# Patient Record
Sex: MALE | Race: WHITE | NOT HISPANIC OR LATINO | Employment: FULL TIME | URBAN - METROPOLITAN AREA
[De-identification: names, ages, dates, MRNs, and addresses within clinical notes are randomized per-mention and may not be internally consistent; named-entity substitution may affect disease eponyms.]

---

## 2022-07-02 ENCOUNTER — HOSPITAL ENCOUNTER (EMERGENCY)
Facility: CLINIC | Age: 37
Discharge: HOME | End: 2022-07-02
Attending: EMERGENCY MEDICINE | Admitting: EMERGENCY MEDICINE
Payer: COMMERCIAL

## 2022-07-02 ENCOUNTER — APPOINTMENT (OUTPATIENT)
Dept: GENERAL RADIOLOGY | Facility: CLINIC | Age: 37
End: 2022-07-02
Attending: EMERGENCY MEDICINE
Payer: COMMERCIAL

## 2022-07-02 VITALS
SYSTOLIC BLOOD PRESSURE: 138 MMHG | DIASTOLIC BLOOD PRESSURE: 73 MMHG | OXYGEN SATURATION: 99 % | HEART RATE: 97 BPM | RESPIRATION RATE: 20 BRPM | TEMPERATURE: 98.5 F

## 2022-07-02 DIAGNOSIS — S00.83XA CONTUSION OF JAW, INITIAL ENCOUNTER: ICD-10-CM

## 2022-07-02 DIAGNOSIS — S40.011A CONTUSION OF RIGHT SHOULDER, INITIAL ENCOUNTER: ICD-10-CM

## 2022-07-02 DIAGNOSIS — S00.531A CONTUSION OF LIP, INITIAL ENCOUNTER: ICD-10-CM

## 2022-07-02 PROCEDURE — 99283 EMERGENCY DEPT VISIT LOW MDM: CPT

## 2022-07-02 PROCEDURE — 250N000013 HC RX MED GY IP 250 OP 250 PS 637: Performed by: EMERGENCY MEDICINE

## 2022-07-02 PROCEDURE — 70100 X-RAY EXAM OF JAW <4VIEWS: CPT

## 2022-07-02 RX ORDER — IBUPROFEN 600 MG/1
600 TABLET, FILM COATED ORAL ONCE
Status: COMPLETED | OUTPATIENT
Start: 2022-07-02 | End: 2022-07-02

## 2022-07-02 RX ADMIN — IBUPROFEN 600 MG: 600 TABLET ORAL at 03:44
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
## 2022-09-16 ENCOUNTER — HOSPITAL ENCOUNTER (EMERGENCY)
Facility: CLINIC | Age: 37
Discharge: HOME | End: 2022-09-16
Attending: EMERGENCY MEDICINE | Admitting: EMERGENCY MEDICINE
Payer: OTHER MISCELLANEOUS

## 2022-09-16 VITALS
RESPIRATION RATE: 16 BRPM | TEMPERATURE: 98.1 F | HEART RATE: 74 BPM | OXYGEN SATURATION: 100 % | DIASTOLIC BLOOD PRESSURE: 93 MMHG | SYSTOLIC BLOOD PRESSURE: 148 MMHG

## 2022-09-16 DIAGNOSIS — M54.50 LOW BACK PAIN RADIATING TO RIGHT LOWER EXTREMITY: Primary | ICD-10-CM

## 2022-09-16 DIAGNOSIS — M79.604 LOW BACK PAIN RADIATING TO RIGHT LOWER EXTREMITY: Primary | ICD-10-CM

## 2022-09-16 PROCEDURE — 99284 EMERGENCY DEPT VISIT MOD MDM: CPT

## 2022-09-16 PROCEDURE — 250N000013 HC RX MED GY IP 250 OP 250 PS 637: Performed by: EMERGENCY MEDICINE

## 2022-09-16 RX ORDER — CYCLOBENZAPRINE HCL 10 MG
10 TABLET ORAL ONCE
Status: COMPLETED | OUTPATIENT
Start: 2022-09-16 | End: 2022-09-16

## 2022-09-16 RX ORDER — IBUPROFEN 600 MG/1
600 TABLET, FILM COATED ORAL EVERY 6 HOURS PRN
Qty: 60 TABLET | Refills: 0 | Status: SHIPPED | OUTPATIENT
Start: 2022-09-16

## 2022-09-16 RX ORDER — ACETAMINOPHEN 500 MG
1000 TABLET ORAL ONCE
Status: DISCONTINUED | OUTPATIENT
Start: 2022-09-16 | End: 2022-09-17 | Stop reason: HOSPADM

## 2022-09-16 RX ORDER — CYCLOBENZAPRINE HCL 10 MG
10 TABLET ORAL 3 TIMES DAILY PRN
Qty: 21 TABLET | Refills: 0 | Status: SHIPPED | OUTPATIENT
Start: 2022-09-16 | End: 2022-09-23

## 2022-09-16 RX ORDER — LIDOCAINE 4 G/G
1 PATCH TOPICAL ONCE
Status: DISCONTINUED | OUTPATIENT
Start: 2022-09-16 | End: 2022-09-17 | Stop reason: HOSPADM

## 2022-09-16 RX ORDER — ACETAMINOPHEN 500 MG
1000 TABLET ORAL EVERY 6 HOURS PRN
Qty: 60 TABLET | Refills: 0 | Status: SHIPPED | OUTPATIENT
Start: 2022-09-16 | End: 2022-09-23

## 2022-09-16 RX ORDER — LIDOCAINE 4 G/G
1 PATCH TOPICAL EVERY 24 HOURS
Qty: 10 PATCH | Refills: 0 | Status: SHIPPED | OUTPATIENT
Start: 2022-09-16

## 2022-09-16 RX ORDER — METHYLPREDNISOLONE 4 MG
TABLET, DOSE PACK ORAL
Qty: 21 TABLET | Refills: 0 | Status: SHIPPED | OUTPATIENT
Start: 2022-09-16

## 2022-09-16 RX ADMIN — CYCLOBENZAPRINE 10 MG: 10 TABLET, FILM COATED ORAL at 23:15

## 2022-09-16 ASSESSMENT — ENCOUNTER SYMPTOMS
JOINT SWELLING: 0
DIARRHEA: 0
NAUSEA: 0
NUMBNESS: 0
NECK PAIN: 0
FLANK PAIN: 0
DYSURIA: 0
WEAKNESS: 0
VOMITING: 0
BACK PAIN: 1
ABDOMINAL PAIN: 0
SHORTNESS OF BREATH: 0
MYALGIAS: 0
FEVER: 0

## 2022-09-16 ASSESSMENT — ACTIVITIES OF DAILY LIVING (ADL): ADLS_ACUITY_SCORE: 33

## 2022-09-17 NOTE — ED PROVIDER NOTES
History   Chief Complaint:  Back Pain    HPI   Eleuterio Gallo is a 36 year old male who presents with low back pain.  Patient notes that he developed bilateral low back pain worse on the right side while at work.  He denies any direct trauma to his back but was lifting things and it started.  He is worried he may have pulled a muscle.  He has no history of surgery to the back.  No history of IV drug use.  Denies fever.  Denies dysuria and urinary incontinence.  Denies any stool incontinence.  Denies numbness or tingling to the lower extremities.  He does note that when he tries to lift his leg when he is standing up or sitting down he has increasing pain in the lower back.  He has tried ibuprofen with minimal improvement in his symptoms.  He is worried about going to work tomorrow with the ongoing back pain.    ROS:  Review of Systems   Constitutional: Negative for fever.   Respiratory: Negative for shortness of breath.    Cardiovascular: Negative for chest pain.   Gastrointestinal: Negative for abdominal pain, diarrhea, nausea and vomiting.   Genitourinary: Negative for dysuria and flank pain.   Musculoskeletal: Positive for back pain. Negative for joint swelling, myalgias and neck pain.   Skin: Negative for rash.   Neurological: Negative for weakness and numbness.   All other systems reviewed and are negative.    Allergies:  No Known Allergies     Medications:    acetaminophen (TYLENOL) 500 MG tablet  cyclobenzaprine (FLEXERIL) 10 MG tablet  ibuprofen (ADVIL/MOTRIN) 600 MG tablet  Lidocaine (LIDOCARE) 4 % Patch  methylPREDNISolone (MEDROL DOSEPAK) 4 MG tablet therapy pack  benzoyl peroxide (BENZOYL PEROXIDE WASH) 5 % LIQD  doxycycline (VIBRAMYCIN) 100 MG capsule  KEFLEX 500 MG OR CAPS  tretinoin (RETIN-A) 0.05 % cream      Past Medical History:    No past medical history on file.    Past Surgical History:    No past surgical history on file.     Family History:    family history is not on file.    Social  History:   reports that he has never smoked. He does not have any smokeless tobacco history on file.  PCP: [facility], [place]     Physical Exam     Patient Vitals for the past 24 hrs:   BP Temp Temp src Pulse Resp SpO2   09/16/22 2131 (!) 148/93 98.1  F (36.7  C) Temporal 74 16 100 %      Physical Exam  General: The patient appears uncomfortable  Head:  The scalp, face, and head appear normal  Eyes:  The pupils are equal, round, and reactive to light    There is no nystagmus    Extraocular muscles are intact    Conjunctivae and sclerae are normal  ENT:    The nose is normal    Pinnae are normal    The oropharynx is normal    Uvula is in the midline  Neck:  Normal range of motion    There is no midline cervical spine pain/tenderness  CV:  Regular rate and underlying rhythm     Normal S1 and S2    No S3 or S4    No pathological murmur detected  Resp:  Lungs are clear    There is no tachypnea    Non-labored breathing    No rales    No wheezing   GI:  Abdomen is soft, there is no rigidity    No distension    No tympani    No rebound tenderness     Non-surgical without peritoneal features  MS:  Back:    The cervical and thoracic spine is non-tender in the midline    The lumbar spine is non-tender in the midline    There is right lumbar paraspinous muscular pain to palpation    Legs:    There is normal motor strength of bilateral lower extremities    There is no sensory abnormality/paresthesia    There is no numbness    There is no radiculopathy    There is normal capillary refill to the toes  Skin:  No rash or acute skin lesions noted.  No shingles noted.  Neuro: Speech is normal and fluent.  Antalgic gait.  Psych:  Awake. Alert.  Normal affect.  Appropriate interactions.    Emergency Department Course     Laboratory:  Labs Ordered and Resulted from Time of ED Arrival to Time of ED Departure - No data to display     Procedures     Emergency Department Course:     Reviewed:  I reviewed nursing notes, vitals and  past medical history    Assessments:  2250 I obtained history and examined the patient as noted above.   2300 I rechecked the patient and explained findings.     Consults:   None    Interventions:  Medications   acetaminophen (TYLENOL) tablet 1,000 mg (1,000 mg Oral Not Given 9/16/22 2316)   Lidocaine (LIDOCARE) 4 % Patch 1 patch (1 patch Transdermal Not Given 9/16/22 2316)   lidocaine patch in PLACE (has no administration in time range)   cyclobenzaprine (FLEXERIL) tablet 10 mg (10 mg Oral Given 9/16/22 2315)      Disposition:  The patient was discharged to home.     Impression & Plan    CMS Diagnoses: None    Medical Decision Making:  Eleuterio Gallo is a 36 year old male who presents for evaluation of back pain that started after bending/lifting maneuvers at work.  He does not have a history of back pain in the past.  Pain has improved with interventions in the emergency department. The patient did not sustain any trauma, therefore x-rays are not necessary due to the low likelihood of fracture or subluxation.  No red flag symptoms to suggest CT and/or MRI is indicated at this point.  There is no clinical evidence of cauda equina syndrome, discitis, spinal/epidural space hematoma or epidural abscess or other worrisome etiology. The neurological exam is normal and the patient's symptoms seem consistent with musculoskeletal issues and significant muscle spasm.     The patient will be discharged with pain medications & a steroid dose danny to use as directed. Ice or heat to the back and stretching exercises. No heavy lifting, bending or twisting. Return if increasing pain, numbness, weakness, or bowel or bladder dysfunction. He was advised to schedule follow-up with his primary doctor within 3-7 days to re-assess symptoms.    Diagnosis:    ICD-10-CM    1. Low back pain radiating to right lower extremity  M54.50     M79.604       Discharge Medications:  Discharge Medication List as of 9/16/2022 11:06 PM      START  taking these medications    Details   acetaminophen (TYLENOL) 500 MG tablet Take 2 tablets (1,000 mg) by mouth every 6 hours as needed for pain or fever, Disp-60 tablet, R-0, Local Print      cyclobenzaprine (FLEXERIL) 10 MG tablet Take 1 tablet (10 mg) by mouth 3 times daily as needed for muscle spasms, Disp-21 tablet, R-0, Local Print      ibuprofen (ADVIL/MOTRIN) 600 MG tablet Take 1 tablet (600 mg) by mouth every 6 hours as needed for moderate pain, Disp-60 tablet, R-0, Local Print      Lidocaine (LIDOCARE) 4 % Patch Place 1 patch onto the skin every 24 hours To prevent lidocaine toxicity, patient should be patch free for 12 hrs daily.Disp-10 patch, R-0Local Print      methylPREDNISolone (MEDROL DOSEPAK) 4 MG tablet therapy pack Follow Package Directions, Disp-21 tablet, R-0, Local Print            9/16/2022   Ash Alberto MD White, Scott, MD  09/16/22 6917

## 2022-09-17 NOTE — ED TRIAGE NOTES
John lower back pain started at work. Denies trauma but was lifting things and feels like he may have pulled muscles. Pt states unable to lift his legs up d/t pain. Advil last 1.5hr PTA

## 2022-11-19 ENCOUNTER — HEALTH MAINTENANCE LETTER (OUTPATIENT)
Age: 37
End: 2022-11-19

## 2024-01-28 ENCOUNTER — HEALTH MAINTENANCE LETTER (OUTPATIENT)
Age: 39
End: 2024-01-28

## 2024-05-25 ENCOUNTER — HOSPITAL ENCOUNTER (EMERGENCY)
Facility: CLINIC | Age: 39
Discharge: HOME | End: 2024-05-25
Attending: PHYSICIAN ASSISTANT | Admitting: PHYSICIAN ASSISTANT
Payer: COMMERCIAL

## 2024-05-25 ENCOUNTER — APPOINTMENT (OUTPATIENT)
Dept: GENERAL RADIOLOGY | Facility: CLINIC | Age: 39
End: 2024-05-25
Attending: PHYSICIAN ASSISTANT
Payer: COMMERCIAL

## 2024-05-25 VITALS
HEIGHT: 69 IN | WEIGHT: 163.14 LBS | OXYGEN SATURATION: 99 % | SYSTOLIC BLOOD PRESSURE: 136 MMHG | TEMPERATURE: 98.3 F | HEART RATE: 86 BPM | RESPIRATION RATE: 16 BRPM | BODY MASS INDEX: 24.16 KG/M2 | DIASTOLIC BLOOD PRESSURE: 80 MMHG

## 2024-05-25 DIAGNOSIS — S09.90XA HEAD INJURY, INITIAL ENCOUNTER: ICD-10-CM

## 2024-05-25 DIAGNOSIS — S80.01XA CONTUSION OF RIGHT KNEE, INITIAL ENCOUNTER: ICD-10-CM

## 2024-05-25 PROCEDURE — 99283 EMERGENCY DEPT VISIT LOW MDM: CPT

## 2024-05-25 PROCEDURE — 73562 X-RAY EXAM OF KNEE 3: CPT | Mod: RT

## 2024-05-25 ASSESSMENT — COLUMBIA-SUICIDE SEVERITY RATING SCALE - C-SSRS
6. HAVE YOU EVER DONE ANYTHING, STARTED TO DO ANYTHING, OR PREPARED TO DO ANYTHING TO END YOUR LIFE?: NO
1. IN THE PAST MONTH, HAVE YOU WISHED YOU WERE DEAD OR WISHED YOU COULD GO TO SLEEP AND NOT WAKE UP?: NO
2. HAVE YOU ACTUALLY HAD ANY THOUGHTS OF KILLING YOURSELF IN THE PAST MONTH?: NO

## 2024-05-25 ASSESSMENT — ACTIVITIES OF DAILY LIVING (ADL): ADLS_ACUITY_SCORE: 33

## 2024-05-25 NOTE — ED PROVIDER NOTES
"    History     Chief Complaint:  Fall, Knee Pain, and Head Injury       HPI   Eleuterio Gallo is a 38 year old male who presents after he slipped at a hotel bathroom fell forward hit his right knee and then kneeled over and hit his head.  He did not lose consciousness.  No neck pain.  No vomiting.  He has a minor headache.  He also has right knee pain.  He is not on any blood thinners.  This was a mechanical fall.  He is concerned about the bruising on his head and knee.  No back pain chest pain belly pain or other extremity pain.  No focal weakness or numbness.  No vision changes.      Independent Historian:        Review of External Notes:        Medications:    benzoyl peroxide (BENZOYL PEROXIDE WASH) 5 % LIQD  doxycycline (VIBRAMYCIN) 100 MG capsule  ibuprofen (ADVIL/MOTRIN) 600 MG tablet  KEFLEX 500 MG OR CAPS  Lidocaine (LIDOCARE) 4 % Patch  methylPREDNISolone (MEDROL DOSEPAK) 4 MG tablet therapy pack  tretinoin (RETIN-A) 0.05 % cream        Past Medical History:    No past medical history on file.    Past Surgical History:    No past surgical history on file.       Physical Exam   Patient Vitals for the past 24 hrs:   BP Temp Temp src Pulse Resp SpO2 Height Weight   05/25/24 1751 136/80 -- -- -- -- -- -- --   05/25/24 1750 -- 98.3  F (36.8  C) Temporal 86 16 99 % 1.753 m (5' 9\") 74 kg (163 lb 2.3 oz)        Physical Exam  Vitals and nursing note reviewed.   Constitutional:       Appearance: He is not diaphoretic.   HENT:      Head: Contusion present. No raccoon eyes, Hansen's sign, right periorbital erythema or left periorbital erythema.        Right Ear: Tympanic membrane, ear canal and external ear normal.      Left Ear: Tympanic membrane, ear canal and external ear normal.      Nose: Nose normal. No congestion.      Mouth/Throat:      Mouth: Mucous membranes are moist.      Pharynx: Oropharynx is clear.   Eyes:      General: Lids are normal. No scleral icterus.     Extraocular Movements:      Right eye: " Normal extraocular motion.      Left eye: Normal extraocular motion.      Conjunctiva/sclera: Conjunctivae normal.      Pupils: Pupils are equal, round, and reactive to light.   Cardiovascular:      Rate and Rhythm: Normal rate and regular rhythm.      Heart sounds: Normal heart sounds. No murmur heard.     No friction rub. No gallop.   Pulmonary:      Effort: Pulmonary effort is normal. No respiratory distress.      Breath sounds: Normal breath sounds. No stridor. No wheezing, rhonchi or rales.   Musculoskeletal:      Cervical back: Neck supple. No tenderness or bony tenderness. No pain with movement, spinous process tenderness or muscular tenderness. Normal range of motion.      Thoracic back: No deformity. Normal range of motion.      Lumbar back: No deformity. Normal range of motion.      Right hip: No tenderness. Normal range of motion. Normal strength.      Left hip: No tenderness. Normal range of motion. Normal strength.      Right upper leg: No swelling, edema or tenderness.      Left upper leg: No swelling, edema or tenderness.      Right knee: No swelling or deformity. Normal range of motion. Tenderness (Anterior patella) present. No medial joint line, lateral joint line, MCL, LCL, ACL or PCL tenderness. No LCL laxity, MCL laxity, ACL laxity or PCL laxity. Normal patellar mobility.      Left knee: No swelling or deformity. Normal range of motion. No tenderness. No medial joint line or lateral joint line tenderness.      Right lower leg: Normal. No swelling or deformity.      Left lower leg: Normal. No swelling or deformity.      Right ankle: Normal. No swelling or deformity. Normal range of motion. Normal pulse.      Left ankle: No swelling or deformity. Normal range of motion. Normal pulse.   Skin:     Findings: Bruising (frontal scalp and right anterior knee) present.   Neurological:      Mental Status: He is alert and oriented to person, place, and time. Mental status is at baseline.      GCS: GCS eye  "subscore is 4. GCS verbal subscore is 5. GCS motor subscore is 6.      Cranial Nerves: No cranial nerve deficit, dysarthria or facial asymmetry.      Sensory: No sensory deficit.      Motor: No weakness or pronator drift.      Coordination: Romberg sign negative. Coordination normal. Finger-Nose-Finger Test normal.      Gait: Gait normal.   Psychiatric:         Mood and Affect: Mood is anxious.         Behavior: Behavior normal.         Thought Content: Thought content normal.           Emergency Department Course       Imaging:  XR Knee Right 3 Views   Final Result   IMPRESSION: Normal joint spaces and alignment. No fracture or joint effusion. Mild prepatellar soft tissue thickening versus swelling.          Laboratory:  Labs Ordered and Resulted from Time of ED Arrival to Time of ED Departure - No data to display     Procedures       Emergency Department Course & Assessments:    Interventions:  Medications - No data to display       Independent Interpretation (X-rays, CTs, rhythm strip):  Right knee x-ray: No fracture or dislocation.    Consultations/Discussion of Management or Tests:    ED Course as of 05/25/24 2346   Sat May 25, 2024   1836 Gleason CT Head Injury/Trauma Rule from [employer]  on 5/25/2024  ** All calculations should be rechecked by clinician prior to use **    RESULT SUMMARY:  CT Unnecessary    The Gleason CT Head Rule suggests a head CT is not necessary for this patient (sensitivity % for all intracranial traumatic findings, sensitivity 100% for findings requiring neurosurgical intervention).      INPUTS:  Age  -> 0 = No  Patient on blood thinners -> 0 = No  Seizure after injury -> 0 =  No  GCS  -> 0 = No  Suspected open or depressed skull fracture -> 0 = No  Any sign of basilar skull fracture? -> 0 = No  =2 episodes of vomiting -> 0 = No  Age =65 years -> 0 = No  Retrograde amnesia to the event = 30 minutes -> 0 = No  \"Dangerous\" mechanism? -> 0 = No         Social Determinants of " Health affecting care:  None     Disposition:  The patient was discharged.    Impression & Plan    CMS Diagnoses: None       Medical Decision Making:    Eleuterio Gallo is a 38 year old male who presents for evaluation of closed head injury. History and exam are most consistent with concussion. The differential diagnosis also includes skull fracture and various types of intracranial hemorrhage (e.g., epidural hematoma, subdural hematoma). The patient s did not present with  red flag  characteristics based on established clinical guidelines to suggest more serious intracranial injury or indicate CT imaging. The patient does not take blood thinners.  I have discussed the risk/benefit analysis with the patient and family regarding CT imaging.  We have decided to hold off on imaging at this time.  He and his family understand return is required for worsening headache, vomiting, confusion, and other concerning symptoms. I provided information regarding on head injury in writing upon discharge. We discussed the second impact syndrome. We discussed the importance of not sustaining a concussion while still symptomatic. I advised that some concussions can be associated with long-lasting symptoms (post-concussive syndrome). I recommended primary care follow up in 2-3 days for recheck, and return precautions as above.     Regarding patient's knee pain.  No fracture dislocation of the x-ray.  Slight contusion of the knee.  Likely just bruising.  Recommend over-the-counter anti-inflammatories ice rest.  He is ambulatory.  There is no concerning additional injury of the back rest of the leg upper or other lower extremities.  Follow-up with primary care provider if symptoms are persistent.  Return back if he develops worsening knee pain or worsening condition of the knee.      Diagnosis:    ICD-10-CM    1. Head injury, initial encounter  S09.90XA       2. Contusion of right knee, initial encounter  S80.01XA            Discharge  Medications:  Discharge Medication List as of 5/25/2024  6:46 PM           5/25/2024   Wellington Young, Wellington Deng PA-C  05/25/24 9008

## 2024-05-25 NOTE — ED TRIAGE NOTES
Patient reports slipping in bathtub. Complains of pain in right knee and head. No open wounds. CMS intact. Able to ambulate without difficulty. No LOC.

## 2025-02-01 ENCOUNTER — HEALTH MAINTENANCE LETTER (OUTPATIENT)
Age: 40
End: 2025-02-01